# Patient Record
Sex: FEMALE | Race: WHITE | NOT HISPANIC OR LATINO | URBAN - METROPOLITAN AREA
[De-identification: names, ages, dates, MRNs, and addresses within clinical notes are randomized per-mention and may not be internally consistent; named-entity substitution may affect disease eponyms.]

---

## 2019-11-18 ENCOUNTER — APPOINTMENT (RX ONLY)
Dept: URBAN - METROPOLITAN AREA CLINIC 170 | Facility: CLINIC | Age: 39
Setting detail: DERMATOLOGY
End: 2019-11-18

## 2019-11-18 DIAGNOSIS — D18.0 HEMANGIOMA: ICD-10-CM

## 2019-11-18 DIAGNOSIS — L81.4 OTHER MELANIN HYPERPIGMENTATION: ICD-10-CM

## 2019-11-18 DIAGNOSIS — D22 MELANOCYTIC NEVI: ICD-10-CM

## 2019-11-18 DIAGNOSIS — L30.1 DYSHIDROSIS [POMPHOLYX]: ICD-10-CM

## 2019-11-18 DIAGNOSIS — H02.72 MADAROSIS OF EYELID AND PERIOCULAR AREA: ICD-10-CM

## 2019-11-18 DIAGNOSIS — L82.1 OTHER SEBORRHEIC KERATOSIS: ICD-10-CM

## 2019-11-18 DIAGNOSIS — L21.8 OTHER SEBORRHEIC DERMATITIS: ICD-10-CM

## 2019-11-18 PROBLEM — H02.724 MADAROSIS OF LEFT UPPER EYELID AND PERIOCULAR AREA: Status: ACTIVE | Noted: 2019-11-18

## 2019-11-18 PROBLEM — D18.01 HEMANGIOMA OF SKIN AND SUBCUTANEOUS TISSUE: Status: ACTIVE | Noted: 2019-11-18

## 2019-11-18 PROBLEM — D22.5 MELANOCYTIC NEVI OF TRUNK: Status: ACTIVE | Noted: 2019-11-18

## 2019-11-18 PROBLEM — D48.5 NEOPLASM OF UNCERTAIN BEHAVIOR OF SKIN: Status: ACTIVE | Noted: 2019-11-18

## 2019-11-18 PROBLEM — H02.721 MADAROSIS OF RIGHT UPPER EYELID AND PERIOCULAR AREA: Status: ACTIVE | Noted: 2019-11-18

## 2019-11-18 PROCEDURE — ? DEFER

## 2019-11-18 PROCEDURE — ? FULL BODY SKIN EXAM

## 2019-11-18 PROCEDURE — 99214 OFFICE O/P EST MOD 30 MIN: CPT

## 2019-11-18 PROCEDURE — ? LATISSE COUNSELING

## 2019-11-18 PROCEDURE — ? PRESCRIPTION

## 2019-11-18 PROCEDURE — ? SUNSCREEN RECOMMENDATIONS

## 2019-11-18 PROCEDURE — ? IN-HOUSE DISPENSING PHARMACY

## 2019-11-18 PROCEDURE — ? PRESCRIPTION SAMPLES PROVIDED

## 2019-11-18 PROCEDURE — ? COUNSELING

## 2019-11-18 RX ORDER — KETOCONAZOLE 20.5 MG/ML
SHAMPOO, SUSPENSION TOPICAL
Qty: 1 | Refills: 2 | Status: ERX | COMMUNITY
Start: 2019-11-18

## 2019-11-18 RX ORDER — HALOBETASOL PROPIONATE 0.5 MG/G
AEROSOL, FOAM TOPICAL
Qty: 1 | Refills: 2 | Status: ERX | COMMUNITY
Start: 2019-11-18

## 2019-11-18 RX ORDER — CRISABOROLE 20 MG/G
OINTMENT TOPICAL
Qty: 1 | Refills: 2 | Status: ERX | COMMUNITY
Start: 2019-11-18

## 2019-11-18 RX ORDER — EMOLLIENT COMBINATION NO.53
LOTION (ML) TOPICAL
Qty: 1 | Refills: 2 | Status: ERX | COMMUNITY
Start: 2019-11-18

## 2019-11-18 RX ADMIN — KETOCONAZOLE ONE: 20.5 SHAMPOO, SUSPENSION TOPICAL at 00:00

## 2019-11-18 RX ADMIN — Medication ONE: at 00:00

## 2019-11-18 RX ADMIN — HALOBETASOL PROPIONATE ONE: 0.5 AEROSOL, FOAM TOPICAL at 00:00

## 2019-11-18 RX ADMIN — CRISABOROLE ONE: 20 OINTMENT TOPICAL at 00:00

## 2019-11-18 ASSESSMENT — LOCATION SIMPLE DESCRIPTION DERM
LOCATION SIMPLE: RIGHT MEDIAL SUPERIOR TARSAL REGION
LOCATION SIMPLE: UPPER BACK
LOCATION SIMPLE: LEFT UPPER BACK
LOCATION SIMPLE: SCALP
LOCATION SIMPLE: RIGHT HAND
LOCATION SIMPLE: LEFT MEDIAL SUPERIOR TARSAL REGION
LOCATION SIMPLE: ABDOMEN
LOCATION SIMPLE: RIGHT SHOULDER
LOCATION SIMPLE: RIGHT LOWER BACK

## 2019-11-18 ASSESSMENT — LOCATION DETAILED DESCRIPTION DERM
LOCATION DETAILED: RIGHT ULNAR PALM
LOCATION DETAILED: RIGHT MEDIAL SUPERIOR TARSAL REGION
LOCATION DETAILED: LEFT SUPERIOR PARIETAL SCALP
LOCATION DETAILED: RIGHT RIB CAGE
LOCATION DETAILED: RIGHT ANTERIOR SHOULDER
LOCATION DETAILED: EPIGASTRIC SKIN
LOCATION DETAILED: LEFT MID-UPPER BACK
LOCATION DETAILED: RIGHT INFERIOR MEDIAL MIDBACK
LOCATION DETAILED: RIGHT HYPOTHENAR EMINENCE
LOCATION DETAILED: LEFT MEDIAL SUPERIOR TARSAL REGION
LOCATION DETAILED: INFERIOR THORACIC SPINE

## 2019-11-18 ASSESSMENT — LOCATION ZONE DERM
LOCATION ZONE: EYELID
LOCATION ZONE: TRUNK
LOCATION ZONE: ARM
LOCATION ZONE: SCALP
LOCATION ZONE: HAND

## 2019-11-18 ASSESSMENT — SEVERITY ASSESSMENT
SEVERITY: MILD TO MODERATE
HOW SEVERE IS THIS PATIENT'S CONDITION?: MODERATE

## 2019-11-18 NOTE — PROCEDURE: LATISSE COUNSELING
Detail Level: Simple
Latisse Counseling: I reviewed the possible side-effects of Latisse including itching, eye irritation, discoloration and exacerbating glaucoma.  I also discussed application methods.

## 2019-11-18 NOTE — PROCEDURE: IN-HOUSE DISPENSING PHARMACY
Name Of Product 2: Obagi C-Clarifying serum N-D
Product 78 Price/Unit (In Dollars): 0
Product 72 Unit Type: mg
Product 2 Application Directions: Apply qam
Name Of Product 3: Latisse 0.03%
Render Refills If Set To 0: Yes
Product 3 Price/Unit (In Dollars): 01
Name Of Product 1: Obagi Tretinoin 0.025%
Product 3 Amount/Unit (Numbers Only): 3
Product 1 Application Directions: Apply 1-2x weekly at night before increasing to QPM as tolerated.
Product 1 Unit Type: grams
Detail Level: Zone
Product 3 Units Dispensed: 1
Product 1 Price/Unit (In Dollars): 45
Product 3 Application Directions: Qpm
Product 3 Unit Type: ml
Product 1 Amount/Unit (Numbers Only): 20
Name Of Product 4: Obagi Tretinoin 0.05% cream

## 2019-11-18 NOTE — HPI: SKIN LESIONS
How Severe Is Your Skin Lesion?: mild
Have Your Skin Lesions Been Treated?: not been treated
Is This A New Presentation, Or A Follow-Up?: Skin Lesions
Additional History: Eval hands for dry skin/eczema- has used Triam cream from [facility] recently, but feels like the cream just comes right off, prefers foam.

## 2019-12-09 ENCOUNTER — APPOINTMENT (RX ONLY)
Dept: URBAN - METROPOLITAN AREA CLINIC 170 | Facility: CLINIC | Age: 39
Setting detail: DERMATOLOGY
End: 2019-12-09

## 2019-12-09 DIAGNOSIS — D22 MELANOCYTIC NEVI: ICD-10-CM

## 2019-12-09 DIAGNOSIS — L90.8 OTHER ATROPHIC DISORDERS OF SKIN: ICD-10-CM

## 2019-12-09 PROBLEM — D48.5 NEOPLASM OF UNCERTAIN BEHAVIOR OF SKIN: Status: ACTIVE | Noted: 2019-12-09

## 2019-12-09 PROCEDURE — ? EDUCATIONAL RESOURCES PROVIDED

## 2019-12-09 PROCEDURE — 11102 TANGNTL BX SKIN SINGLE LES: CPT

## 2019-12-09 PROCEDURE — ? IN-HOUSE DISPENSING PHARMACY

## 2019-12-09 PROCEDURE — ? BIOPSY BY SHAVE METHOD

## 2019-12-09 PROCEDURE — ? ADDITIONAL NOTES

## 2019-12-09 PROCEDURE — 11103 TANGNTL BX SKIN EA SEP/ADDL: CPT

## 2019-12-09 ASSESSMENT — LOCATION DETAILED DESCRIPTION DERM
LOCATION DETAILED: LEFT MID-UPPER BACK
LOCATION DETAILED: RIGHT LATERAL ABDOMEN

## 2019-12-09 ASSESSMENT — LOCATION SIMPLE DESCRIPTION DERM
LOCATION SIMPLE: ABDOMEN
LOCATION SIMPLE: LEFT UPPER BACK

## 2019-12-09 ASSESSMENT — LOCATION ZONE DERM: LOCATION ZONE: TRUNK

## 2019-12-09 NOTE — PROCEDURE: BIOPSY BY SHAVE METHOD
Hide Biopsy Depth?: No
Anesthesia Volume In Cc (Will Not Render If 0): 0.5
Consent: Written consent was obtained and risks were reviewed including but not limited to scarring, infection, bleeding, scabbing, incomplete removal, nerve damage and allergy to anesthesia.
Biopsy Type: H and E
Type Of Destruction Used: Curettage
Path Notes (To The Dermatopathologist): check margins
Render Post-Care Instructions In Note?: yes
Dressing: Band-Aid
Electrodesiccation And Curettage Text: The wound bed was treated with electrodesiccation and curettage after the biopsy was performed.
Detail Level: Detailed
Billing Type: Third-Party Bill
Biopsy Method: double edge Personna blade
Hemostasis: Aluminum Chloride and Electrocautery
Silver Nitrate Text: The wound bed was treated with silver nitrate after the biopsy was performed.
Lab: [id number]
Curettage Text: The wound bed was treated with curettage after the biopsy was performed.
Size Of Lesion In Cm: 0.6
Anesthesia Type: 1% Xylocaine with epinephrine
Post-Care Instructions: I reviewed with the patient in detail post-care instructions. Patient is to keep the biopsy site dry overnight, and then apply polysporin, vaseline or aquaphor twice daily until healed.
Notification Instructions: Patient will be notified of biopsy results. However, patient instructed to call the office if not contacted within 2 weeks.
Depth Of Biopsy: dermis
Wound Care: Bacitracin
Lab Facility: [id number]
Cryotherapy Text: The wound bed was treated with cryotherapy after the biopsy was performed.
Additional Anesthesia Volume In Cc (Will Not Render If 0): 0
Electrodesiccation Text: The wound bed was treated with electrodesiccation after the biopsy was performed.

## 2019-12-09 NOTE — PROCEDURE: IN-HOUSE DISPENSING PHARMACY
Product 26 Refills: 0
Product 44 Unit Type: mg
Detail Level: Zone
Send Charges To Patient Encounter: Yes
Product 1 Price/Unit (In Dollars): 45
Product 3 Application Directions: Qpm
Product 1 Refills: 1
Product 3 Unit Type: ml
Product 1 Amount/Unit (Numbers Only): 20
Name Of Product 2: Obagi C-Clarifying serum N-D
Name Of Product 4: Obagi Tretinoin 0.05% cream
Product 2 Application Directions: Apply qam
Name Of Product 3: Latisse 0.03%
Product 3 Price/Unit (In Dollars): 01
Name Of Product 1: Obagi Tretinoin 0.025%
Product 3 Amount/Unit (Numbers Only): 3
Product 1 Application Directions: Apply 1-2x weekly at night before increasing to QPM as tolerated.
Product 1 Unit Type: grams

## 2019-12-09 NOTE — PROCEDURE: ADDITIONAL NOTES
Detail Level: Detailed
Additional Notes: Written instructions given with Phloretin handout. Use Skinceuticals LHA cleanser QAM, Phloretin CF QAM, then Cerave AM moisturizer with spf. R/Essentials Advanced Eye Repair BID. Skinceuticals Blemish and Age Defense BID for breakouts. Ok to use OTC Neutrogena cleanser at night, followed by Cerave PM moisturizer the Obagi Tretinoin 0.025% cream 1-2x weekly, increase to QPM as tolerated.

## 2021-01-06 ENCOUNTER — APPOINTMENT (RX ONLY)
Dept: URBAN - METROPOLITAN AREA CLINIC 170 | Facility: CLINIC | Age: 41
Setting detail: DERMATOLOGY
End: 2021-01-06

## 2021-01-06 DIAGNOSIS — D22 MELANOCYTIC NEVI: ICD-10-CM | Status: STABLE

## 2021-01-06 DIAGNOSIS — L82.1 OTHER SEBORRHEIC KERATOSIS: ICD-10-CM | Status: STABLE

## 2021-01-06 DIAGNOSIS — L30.1 DYSHIDROSIS [POMPHOLYX]: ICD-10-CM | Status: WELL CONTROLLED

## 2021-01-06 DIAGNOSIS — D18.0 HEMANGIOMA: ICD-10-CM | Status: STABLE

## 2021-01-06 DIAGNOSIS — L21.8 OTHER SEBORRHEIC DERMATITIS: ICD-10-CM | Status: WELL CONTROLLED

## 2021-01-06 DIAGNOSIS — L71.0 PERIORAL DERMATITIS: ICD-10-CM | Status: INADEQUATELY CONTROLLED

## 2021-01-06 DIAGNOSIS — L81.4 OTHER MELANIN HYPERPIGMENTATION: ICD-10-CM | Status: STABLE

## 2021-01-06 PROBLEM — D22.5 MELANOCYTIC NEVI OF TRUNK: Status: ACTIVE | Noted: 2021-01-06

## 2021-01-06 PROBLEM — D18.01 HEMANGIOMA OF SKIN AND SUBCUTANEOUS TISSUE: Status: ACTIVE | Noted: 2021-01-06

## 2021-01-06 PROCEDURE — ? TREATMENT REGIMEN

## 2021-01-06 PROCEDURE — ? FULL BODY SKIN EXAM

## 2021-01-06 PROCEDURE — ? SUNSCREEN RECOMMENDATIONS

## 2021-01-06 PROCEDURE — ? ADDITIONAL NOTES

## 2021-01-06 PROCEDURE — ? PRESCRIPTION MEDICATION MANAGEMENT

## 2021-01-06 PROCEDURE — 99214 OFFICE O/P EST MOD 30 MIN: CPT

## 2021-01-06 PROCEDURE — ? PRESCRIPTION

## 2021-01-06 PROCEDURE — ? COUNSELING

## 2021-01-06 RX ORDER — METRONIDAZOLE 7.5 MG/G
CREAM TOPICAL
Qty: 1 | Refills: 1 | Status: ERX | COMMUNITY
Start: 2021-01-06

## 2021-01-06 RX ORDER — CLINDAMYCIN PHOSPHATE 10 MG/ML
LOTION TOPICAL
Qty: 1 | Refills: 1 | Status: ERX | COMMUNITY
Start: 2021-01-06

## 2021-01-06 RX ORDER — KETOCONAZOLE 20 MG/ML
SHAMPOO, SUSPENSION TOPICAL
Qty: 1 | Refills: 5 | Status: ERX

## 2021-01-06 RX ADMIN — CLINDAMYCIN PHOSPHATE 1: 10 LOTION TOPICAL at 00:00

## 2021-01-06 RX ADMIN — METRONIDAZOLE 1: 7.5 CREAM TOPICAL at 00:00

## 2021-01-06 ASSESSMENT — LOCATION DETAILED DESCRIPTION DERM
LOCATION DETAILED: RIGHT ANTERIOR SHOULDER
LOCATION DETAILED: RIGHT HYPOTHENAR EMINENCE
LOCATION DETAILED: RIGHT UPPER CUTANEOUS LIP
LOCATION DETAILED: EPIGASTRIC SKIN
LOCATION DETAILED: LEFT SUPERIOR PARIETAL SCALP
LOCATION DETAILED: RIGHT INFERIOR MEDIAL MIDBACK
LOCATION DETAILED: INFERIOR THORACIC SPINE
LOCATION DETAILED: RIGHT ULNAR PALM

## 2021-01-06 ASSESSMENT — INVESTIGATOR STATIC GLOBAL ASSESSMENT
IN YOUR EXPERIENCE, AMONG ALL PATIENTS YOU HAVE SEEN WITH THIS CONDITION, HOW SEVERE IS THIS PATIENT'S CONDITION?: MODERATE

## 2021-01-06 ASSESSMENT — SEVERITY ASSESSMENT 2020: SEVERITY 2020: CLEAR

## 2021-01-06 ASSESSMENT — LOCATION SIMPLE DESCRIPTION DERM
LOCATION SIMPLE: RIGHT SHOULDER
LOCATION SIMPLE: RIGHT HAND
LOCATION SIMPLE: UPPER BACK
LOCATION SIMPLE: SCALP
LOCATION SIMPLE: RIGHT LIP
LOCATION SIMPLE: RIGHT LOWER BACK
LOCATION SIMPLE: ABDOMEN

## 2021-01-06 ASSESSMENT — LOCATION ZONE DERM
LOCATION ZONE: SCALP
LOCATION ZONE: HAND
LOCATION ZONE: TRUNK
LOCATION ZONE: ARM
LOCATION ZONE: LIP

## 2021-01-06 ASSESSMENT — SEVERITY ASSESSMENT: HOW SEVERE IS THIS PATIENT'S CONDITION?: CLEAR

## 2021-01-06 NOTE — PROCEDURE: PRESCRIPTION MEDICATION MANAGEMENT
Render In Strict Bullet Format?: No
Initiate Treatment: Gentle cleansers
Detail Level: Detailed
Continue Regimen: Cerave moisturizer and SkinCeuticals LHA cleanser in the Am
Discontinue Regimen: Neutrogena sal acid cleanser in PM. OTC acne spot tx that seemed to cause issue to start.
Samples Given: Neutrogena hydro boost and Cetaphil gentle face wash
Continue Regimen: Eucrisa 2 % oint Apply to aa hands BID\\n\\nLexette 0.05 % foam Apply to aa bid up to two weeks per month PRN flares only\\n\\nHylatopicPlus lotion Apply to hands BID-TID

## 2021-01-06 NOTE — HPI: EVALUATION OF SKIN LESION(S)
What Type Of Note Output Would You Prefer (Optional)?: Bullet Format
Hpi Title: Evaluation of Skin Lesions
How Severe Are Your Spot(S)?: mild
Have Your Spot(S) Been Treated In The Past?: has not been treated
Additional History: Patient has a small rash on her face, she did online googling and thinks it is perioral dermatitis. She tried one of her topicals on her face with no improvement.

## 2021-11-11 ENCOUNTER — APPOINTMENT (RX ONLY)
Dept: URBAN - METROPOLITAN AREA CLINIC 170 | Facility: CLINIC | Age: 41
Setting detail: DERMATOLOGY
End: 2021-11-11

## 2021-11-11 DIAGNOSIS — L71.0 PERIORAL DERMATITIS: ICD-10-CM | Status: INADEQUATELY CONTROLLED

## 2021-11-11 DIAGNOSIS — L90.8 OTHER ATROPHIC DISORDERS OF SKIN: ICD-10-CM

## 2021-11-11 PROCEDURE — ? MEDICATION COUNSELING

## 2021-11-11 PROCEDURE — ? IN-HOUSE DISPENSING PHARMACY

## 2021-11-11 PROCEDURE — 99214 OFFICE O/P EST MOD 30 MIN: CPT

## 2021-11-11 PROCEDURE — ? ADDITIONAL NOTES

## 2021-11-11 PROCEDURE — ? OTC TREATMENT REGIMEN

## 2021-11-11 PROCEDURE — ? PRESCRIPTION MEDICATION MANAGEMENT

## 2021-11-11 PROCEDURE — ? COUNSELING

## 2021-11-11 PROCEDURE — ? PRESCRIPTION

## 2021-11-11 PROCEDURE — ? EDUCATIONAL RESOURCES PROVIDED

## 2021-11-11 RX ORDER — CLINDAMYCIN PHOSPHATE 10 MG/ML
LOTION TOPICAL
Qty: 60 | Refills: 2 | Status: ERX

## 2021-11-11 RX ORDER — METRONIDAZOLE 7.5 MG/G
CREAM TOPICAL
Qty: 45 | Refills: 5 | Status: ERX

## 2021-11-11 RX ORDER — SPIRONOLACTONE 25 MG/1
TABLET, FILM COATED ORAL
Qty: 60 | Refills: 2 | Status: ERX | COMMUNITY
Start: 2021-11-11

## 2021-11-11 RX ADMIN — SPIRONOLACTONE: 25 TABLET, FILM COATED ORAL at 00:00

## 2021-11-11 ASSESSMENT — LOCATION ZONE DERM
LOCATION ZONE: FACE
LOCATION ZONE: LIP

## 2021-11-11 ASSESSMENT — LOCATION SIMPLE DESCRIPTION DERM
LOCATION SIMPLE: RIGHT LIP
LOCATION SIMPLE: INFERIOR FOREHEAD
LOCATION SIMPLE: LEFT LIP

## 2021-11-11 ASSESSMENT — LOCATION DETAILED DESCRIPTION DERM
LOCATION DETAILED: INFERIOR MID FOREHEAD
LOCATION DETAILED: LEFT LOWER CUTANEOUS LIP
LOCATION DETAILED: RIGHT UPPER CUTANEOUS LIP

## 2021-11-11 ASSESSMENT — INVESTIGATOR STATIC GLOBAL ASSESSMENT: IN YOUR EXPERIENCE, AMONG ALL PATIENTS YOU HAVE SEEN WITH THIS CONDITION, HOW SEVERE IS THIS PATIENT'S CONDITION?: MILD

## 2021-11-11 NOTE — PROCEDURE: PRESCRIPTION MEDICATION MANAGEMENT
Render In Strict Bullet Format?: No
Detail Level: Detailed
Continue Regimen: Cerave moisturizer and SkinCeuticals LHA cleanser in the Am, gentle cleansers in the pm, metronidazole cream qd - bid every day
Plan: Pt clears with Metro and Clindamycin, then stops use and flares occur with menstrual cycle. Pt denies any renal disease. Consider Elidel in the future, but pt concerned with $60 cost.

## 2021-11-11 NOTE — PROCEDURE: OTC TREATMENT REGIMEN
Samples Given: La roche posay Hyalu B5 serum
Patient Specific Otc Recommendations (Will Not Stick From Patient To Patient): Recommend Cerave/[place] roche posay Vit C serum QAM. HA moisturizer BID.
Detail Level: Zone